# Patient Record
Sex: MALE | Race: BLACK OR AFRICAN AMERICAN | NOT HISPANIC OR LATINO | Employment: UNEMPLOYED | URBAN - METROPOLITAN AREA
[De-identification: names, ages, dates, MRNs, and addresses within clinical notes are randomized per-mention and may not be internally consistent; named-entity substitution may affect disease eponyms.]

---

## 2023-02-11 ENCOUNTER — HOSPITAL ENCOUNTER (EMERGENCY)
Facility: HOSPITAL | Age: 48
Discharge: HOME | End: 2023-02-11
Attending: INTERNAL MEDICINE
Payer: MEDICAID

## 2023-02-11 VITALS
SYSTOLIC BLOOD PRESSURE: 141 MMHG | HEIGHT: 68 IN | RESPIRATION RATE: 18 BRPM | HEART RATE: 77 BPM | OXYGEN SATURATION: 100 % | BODY MASS INDEX: 23.79 KG/M2 | WEIGHT: 157 LBS | DIASTOLIC BLOOD PRESSURE: 85 MMHG | TEMPERATURE: 99 F

## 2023-02-11 DIAGNOSIS — L03.116 CELLULITIS OF BOTH LOWER EXTREMITIES: Primary | ICD-10-CM

## 2023-02-11 DIAGNOSIS — L03.115 CELLULITIS OF BOTH LOWER EXTREMITIES: Primary | ICD-10-CM

## 2023-02-11 LAB
ALBUMIN SERPL-MCNC: 3.5 G/DL (ref 3.5–5)
ALBUMIN/GLOB SERPL: 1.1 RATIO (ref 1.1–2)
ALP SERPL-CCNC: 75 UNIT/L (ref 40–150)
ALT SERPL-CCNC: 13 UNIT/L (ref 0–55)
AMPHET UR QL SCN: POSITIVE
APPEARANCE UR: CLEAR
AST SERPL-CCNC: 13 UNIT/L (ref 5–34)
BARBITURATE SCN PRESENT UR: NEGATIVE
BASOPHILS # BLD AUTO: 0.04 X10(3)/MCL (ref 0–0.2)
BASOPHILS NFR BLD AUTO: 0.6 %
BENZODIAZ UR QL SCN: NEGATIVE
BILIRUB UR QL STRIP.AUTO: NEGATIVE MG/DL
BILIRUBIN DIRECT+TOT PNL SERPL-MCNC: 0.2 MG/DL
BNP BLD-MCNC: 11.8 PG/ML
BUN SERPL-MCNC: 15.3 MG/DL (ref 8.9–20.6)
CALCIUM SERPL-MCNC: 9.2 MG/DL (ref 8.4–10.2)
CANNABINOIDS UR QL SCN: NEGATIVE
CHLORIDE SERPL-SCNC: 108 MMOL/L (ref 98–107)
CK SERPL-CCNC: 252 U/L (ref 30–200)
CO2 SERPL-SCNC: 25 MMOL/L (ref 22–29)
COCAINE UR QL SCN: POSITIVE
COLOR UR AUTO: NORMAL
CREAT SERPL-MCNC: 1.1 MG/DL (ref 0.73–1.18)
D DIMER PPP IA.FEU-MCNC: 0.41 UG/ML FEU (ref 0–0.5)
EOSINOPHIL # BLD AUTO: 0.15 X10(3)/MCL (ref 0–0.9)
EOSINOPHIL NFR BLD AUTO: 2.1 %
ERYTHROCYTE [DISTWIDTH] IN BLOOD BY AUTOMATED COUNT: 13.8 % (ref 11.5–17)
GFR SERPLBLD CREATININE-BSD FMLA CKD-EPI: >60 MLS/MIN/1.73/M2
GLOBULIN SER-MCNC: 3.1 GM/DL (ref 2.4–3.5)
GLUCOSE SERPL-MCNC: 103 MG/DL (ref 74–100)
GLUCOSE UR QL STRIP.AUTO: NEGATIVE MG/DL
HCT VFR BLD AUTO: 41.7 % (ref 42–52)
HGB BLD-MCNC: 13 GM/DL (ref 14–18)
IMM GRANULOCYTES # BLD AUTO: 0.02 X10(3)/MCL (ref 0–0.04)
IMM GRANULOCYTES NFR BLD AUTO: 0.3 %
INR BLD: 0.95 (ref 2–3)
KETONES UR QL STRIP.AUTO: NEGATIVE MG/DL
LEUKOCYTE ESTERASE UR QL STRIP.AUTO: NEGATIVE UNIT/L
LYMPHOCYTES # BLD AUTO: 1.78 X10(3)/MCL (ref 0.6–4.6)
LYMPHOCYTES NFR BLD AUTO: 24.8 %
MCH RBC QN AUTO: 28 PG
MCHC RBC AUTO-ENTMCNC: 31.2 MG/DL (ref 33–36)
MCV RBC AUTO: 89.9 FL (ref 80–94)
MDMA UR QL SCN: NEGATIVE
MONOCYTES # BLD AUTO: 0.88 X10(3)/MCL (ref 0.1–1.3)
MONOCYTES NFR BLD AUTO: 12.3 %
NEUTROPHILS # BLD AUTO: 4.31 X10(3)/MCL (ref 2.1–9.2)
NEUTROPHILS NFR BLD AUTO: 59.9 %
NITRITE UR QL STRIP.AUTO: NEGATIVE
NRBC BLD AUTO-RTO: 0 %
OPIATES UR QL SCN: NEGATIVE
PCP UR QL: NEGATIVE
PH UR STRIP.AUTO: 7 [PH]
PH UR: 7 [PH] (ref 3–11)
PLATELET # BLD AUTO: 272 X10(3)/MCL (ref 130–400)
PMV BLD AUTO: 10.4 FL (ref 7.4–10.4)
POCT GLUCOSE: 134 MG/DL (ref 70–110)
POTASSIUM SERPL-SCNC: 4.3 MMOL/L (ref 3.5–5.1)
PROT SERPL-MCNC: 6.6 GM/DL (ref 6.4–8.3)
PROT UR QL STRIP.AUTO: NEGATIVE MG/DL
PROTHROMBIN TIME: 12.5 SECONDS (ref 11.7–14.5)
RBC # BLD AUTO: 4.64 X10(6)/MCL (ref 4.7–6.1)
RBC UR QL AUTO: NEGATIVE UNIT/L
SODIUM SERPL-SCNC: 140 MMOL/L (ref 136–145)
SP GR UR STRIP.AUTO: 1.02
TROPONIN I SERPL-MCNC: <0.01 NG/ML (ref 0–0.04)
TSH SERPL-ACNC: 0.65 UIU/ML (ref 0.35–4.94)
UROBILINOGEN UR STRIP-ACNC: 0.2 MG/DL
WBC # SPEC AUTO: 7.2 X10(3)/MCL (ref 4.5–11.5)

## 2023-02-11 PROCEDURE — 84484 ASSAY OF TROPONIN QUANT: CPT | Performed by: INTERNAL MEDICINE

## 2023-02-11 PROCEDURE — 85025 COMPLETE CBC W/AUTO DIFF WBC: CPT | Performed by: INTERNAL MEDICINE

## 2023-02-11 PROCEDURE — 85610 PROTHROMBIN TIME: CPT | Performed by: INTERNAL MEDICINE

## 2023-02-11 PROCEDURE — 82550 ASSAY OF CK (CPK): CPT | Performed by: INTERNAL MEDICINE

## 2023-02-11 PROCEDURE — 81003 URINALYSIS AUTO W/O SCOPE: CPT | Performed by: INTERNAL MEDICINE

## 2023-02-11 PROCEDURE — 80053 COMPREHEN METABOLIC PANEL: CPT | Performed by: INTERNAL MEDICINE

## 2023-02-11 PROCEDURE — 85379 FIBRIN DEGRADATION QUANT: CPT | Performed by: INTERNAL MEDICINE

## 2023-02-11 PROCEDURE — 84443 ASSAY THYROID STIM HORMONE: CPT | Performed by: INTERNAL MEDICINE

## 2023-02-11 PROCEDURE — 80307 DRUG TEST PRSMV CHEM ANLYZR: CPT | Performed by: INTERNAL MEDICINE

## 2023-02-11 PROCEDURE — 83880 ASSAY OF NATRIURETIC PEPTIDE: CPT | Performed by: INTERNAL MEDICINE

## 2023-02-11 PROCEDURE — 99283 EMERGENCY DEPT VISIT LOW MDM: CPT | Mod: 25

## 2023-02-11 PROCEDURE — 25000003 PHARM REV CODE 250: Performed by: INTERNAL MEDICINE

## 2023-02-11 RX ORDER — CEPHALEXIN 500 MG/1
500 CAPSULE ORAL EVERY 12 HOURS
Qty: 20 CAPSULE | Refills: 0 | Status: SHIPPED | OUTPATIENT
Start: 2023-02-11 | End: 2023-02-21

## 2023-02-11 RX ADMIN — SODIUM CHLORIDE 1000 ML: 9 INJECTION, SOLUTION INTRAVENOUS at 03:02

## 2023-02-11 NOTE — ED PROVIDER NOTES
Source of History:  Patient, moderate limitations as poor historian    Chief complaint:  Leg Swelling (C/o franki lower leg swelling X 3 days. Denies SOB, Denies trauma, States this has happened to [facility] in the past but that he does not remember what the problem was at that time)      HPI:  Ander Villagomez is a 47 y.o. male presenting with Leg Swelling (C/o franki lower leg swelling X 3 days. Denies SOB, Denies trauma, States this has happened to [facility] in the past but that he does not remember what the problem was at that time)       48yo M with Hx of drug abuse, CKD 2, HTN, presents with lower extremity swelling x 3 days, says he has been walking a lot for the last few days, not very forthcoming with information, denies SOB, denies CP, denies fever    This patient presents complaining of swelling and pain to the bilateral lower extremity. Onset of symptoms was abrupt starting 3 days ago and has been gradually worsening since that time. The patient does not recall history of trauma to the area. The patient denies being on a recent long auto or plane trip. The patient has not had a history of blood clots or coagulopathy. There is not a history of recent surgery. The patient denies chest pain, hemoptysis, shortness of breath, and cough        Review of Systems   Constitutional symptoms:  Negative except as documented in HPI.   Skin symptoms:  Negative except as documented in HPI.   HEENT symptoms:  Negative except as documented in HPI.   Respiratory symptoms:  Negative except as documented in HPI.   Cardiovascular symptoms:  Negative except as documented in HPI.   Gastrointestinal symptoms:  Negative except as documented in HPI.    Genitourinary symptoms:  Negative except as documented in HPI.   Musculoskeletal symptoms:  Negative except as documented in HPI.   Neurologic symptoms:  Negative except as documented in HPI.   Psychiatric symptoms:  Negative except as documented in HPI.   Allergy/immunologic symptoms:  Negative  "except as documented in HPI.             Additional review of systems information: All other systems reviewed and otherwise negative.      Review of patient's allergies indicates:  No Known Allergies    PMH:  As per HPI and below:    No past medical history on file.     No family history on file.    No past surgical history on file.         There is no problem list on file for this patient.       Physical Exam:    BP (!) 141/85 (BP Location: Left arm, Patient Position: Sitting)   Pulse 77   Temp 98.6 °F (37 °C) (Oral)   Resp 18   Ht 5' 8" (1.727 m)   Wt 71.2 kg (157 lb)   SpO2 100%   BMI 23.87 kg/m²     Nursing note and vital signs reviewed.    General:  Alert, no acute distress.   Skin: Normal for Ethnic Origin, No cyanosis, BL LE erythema up to mid calf  HEENT: Normocephalic and atraumatic, Vision unchanged, Pupils symmetric, No icterus , Nasal mucosa is pink and moist  Cardiovascular:  Regular rate and rhythm, 1+ edema both LE, left worse than right  Chest Wall: No deformity, equal chest rise  Respiratory:  Lungs are clear to auscultation, respirations are non-labored.    Musculoskeletal:  No deformity, Normal perfusion to all extremities  Gastrointestinal:  Soft, Non distended  Neurological:  Alert and oriented, normal motor observed, normal speech observed.    Psychiatric:  Cooperative, flat mood & affect.        Labs that have been ordered have been independently reviewed and interpreted by myself.     Old Chart Reviewed.      Initial Impression/ Differential Dx:  Peripheral edema, uncontrolled hypertension, renal insufficiency, liver failure, DVT, cellulitis, edema related to trauma, hypothyroidism      MDM:      Reviewed Nurses Note.    Reviewed Pertinent old records.    Orders Placed This Encounter    CBC Auto Differential    Comprehensive Metabolic Panel    CK    Urinalysis, Reflex to Urine Culture    Drug Screen, Urine    Troponin I    Protime-INR    BNP    D-Dimer, Quantitative    CBC with " Differential    TSH    Insert peripheral IV    sodium chloride 0.9% bolus 1,000 mL 1,000 mL    cephALEXin (KEFLEX) 500 MG capsule                    Labs Reviewed   COMPREHENSIVE METABOLIC PANEL - Abnormal; Notable for the following components:       Result Value    Chloride 108 (*)     Glucose Level 103 (*)     All other components within normal limits   CK - Abnormal; Notable for the following components:    Creatine Kinase 252 (*)     All other components within normal limits   DRUG SCREEN, URINE (BEAKER) - Abnormal; Notable for the following components:    Amphetamines, Urine Positive (*)     Cocaine, Urine Positive (*)     All other components within normal limits    Narrative:     Cut off concentrations:    Amphetamines - 1000 ng/ml  Barbiturates - 200 ng/ml  Benzodiazepine - 200 ng/ml  Cannabinoids (THC) - 50 ng/ml  Cocaine - 300 ng/ml  Fentanyl - 1.0 ng/ml  MDMA - 500 ng/ml  Opiates - 300 ng/ml   Phencyclidine (PCP) - 25 ng/ml    Specimen submitted for drug analysis and tested for pH and specific gravity in order to evaluate sample integrity. Suspect tampering if specific gravity is <1.003 and/or pH is not within the range of 4.5 - 8.0  False negatives may result form substances such as bleach added to urine.  False positives may result for the presence of a substance with similar chemical structure to the drug or its metabolite.    This test provides only a PRELIMINARY analytical test result. A more specific alternate chemical method must be used in order to obtain a confirmed analytical result. Gas chromatography/mass spectrometry (GC/MS) is the preferred confirmatory method. Other chemical confirmation methods are available. Clinical consideration and professional judgement should be applied to any drug of abuse test result, particularly when preliminary positive results are used.    Positive results will be confirmed only at the physicians request. Unconfirmed screening results are to be used only for  medical purposes (treatment).        PROTIME-INR - Abnormal; Notable for the following components:    INR 0.95 (*)     All other components within normal limits   CBC WITH DIFFERENTIAL - Abnormal; Notable for the following components:    RBC 4.64 (*)     Hgb 13.0 (*)     Hct 41.7 (*)     MCHC 31.2 (*)     All other components within normal limits   TROPONIN I - Normal   B-TYPE NATRIURETIC PEPTIDE - Normal   D DIMER, QUANTITATIVE - Normal   CBC W/ AUTO DIFFERENTIAL    Narrative:     The following orders were created for panel order CBC Auto Differential.  Procedure                               Abnormality         Status                     ---------                               -----------         ------                     CBC with Differential[[id number]]        Abnormal            Final result                 Please view results for these tests on the individual orders.   URINALYSIS, REFLEX TO URINE CULTURE   TSH          No orders to display        Admission on 02/11/2023   Component Date Value Ref Range Status    Sodium Level 02/11/2023 140  136 - 145 mmol/L Final    Potassium Level 02/11/2023 4.3  3.5 - 5.1 mmol/L Final    Chloride 02/11/2023 108 (H)  98 - 107 mmol/L Final    Carbon Dioxide 02/11/2023 25  22 - 29 mmol/L Final    Glucose Level 02/11/2023 103 (H)  74 - 100 mg/dL Final    Blood Urea Nitrogen 02/11/2023 15.3  8.9 - 20.6 mg/dL Final    Creatinine 02/11/2023 1.10  0.73 - 1.18 mg/dL Final    Calcium Level Total 02/11/2023 9.2  8.4 - 10.2 mg/dL Final    Protein Total 02/11/2023 6.6  6.4 - 8.3 gm/dL Final    Albumin Level 02/11/2023 3.5  3.5 - 5.0 g/dL Final    Globulin 02/11/2023 3.1  2.4 - 3.5 gm/dL Final    Albumin/Globulin Ratio 02/11/2023 1.1  1.1 - 2.0 ratio Final    Bilirubin Total 02/11/2023 0.2  <=1.5 mg/dL Final    Alkaline Phosphatase 02/11/2023 75  40 - 150 unit/L Final    Alanine Aminotransferase 02/11/2023 13  0 - 55 unit/L Final    Aspartate Aminotransferase 02/11/2023 13  5 - 34 unit/L  Final    eGFR 02/11/2023 >60  mls/min/1.73/m2 Final    Creatine Kinase 02/11/2023 252 (H)  30 - 200 U/L Final    Color, UA 02/11/2023 Straw  Yellow, Light-Yellow, Dark Yellow, Melonie, Straw Final    Appearance, UA 02/11/2023 Clear  Clear Final    Specific Gravity, UA 02/11/2023 1.020   Final    pH, UA 02/11/2023 7.0  5.0 - 8.5 Final    Protein, UA 02/11/2023 Negative  Negative mg/dL Final    Glucose, UA 02/11/2023 Negative  Negative, Normal mg/dL Final    Ketones, UA 02/11/2023 Negative  Negative mg/dL Final    Blood, UA 02/11/2023 Negative  Negative unit/L Final    Bilirubin, UA 02/11/2023 Negative  Negative mg/dL Final    Urobilinogen, UA 02/11/2023 0.2  0.2, 1.0, Normal mg/dL Final    Nitrites, UA 02/11/2023 Negative  Negative Final    Leukocyte Esterase, UA 02/11/2023 Negative  Negative unit/L Final    Amphetamines, Urine 02/11/2023 Positive (A)  Negative Final    Barbituates, Urine 02/11/2023 Negative  Negative Final    Benzodiazepine, Urine 02/11/2023 Negative  Negative Final    Cannabinoids, Urine 02/11/2023 Negative  Negative Final    Cocaine, Urine 02/11/2023 Positive (A)  Negative Final    MDMA, Urine 02/11/2023 Negative  Negative Final    Opiates, Urine 02/11/2023 Negative  Negative Final    Phencyclidine, Urine 02/11/2023 Negative  Negative Final    pH, Urine 02/11/2023 7.0  3.0 - 11.0 Final    Troponin-I 02/11/2023 <0.010  0.000 - 0.045 ng/mL Final    PT 02/11/2023 12.5  11.7 - 14.5 seconds Final    INR 02/11/2023 0.95 (L)  2.00 - 3.00 Final    Natriuretic Peptide 02/11/2023 11.8  <=100.0 pg/mL Final    D-Dimer 02/11/2023 0.41  0.00 - 0.50 ug/mL FEU Final    WBC 02/11/2023 7.2  4.5 - 11.5 x10(3)/mcL Final    RBC 02/11/2023 4.64 (L)  4.70 - 6.10 x10(6)/mcL Final    Hgb 02/11/2023 13.0 (L)  14.0 - 18.0 gm/dL Final    Hct 02/11/2023 41.7 (L)  42.0 - 52.0 % Final    MCV 02/11/2023 89.9  80.0 - 94.0 fL Final    MCH 02/11/2023 28.0  pg Final    MCHC 02/11/2023 31.2 (L)  33.0 - 36.0 mg/dL Final    RDW  02/11/2023 13.8  11.5 - 17.0 % Final    Platelet 02/11/2023 272  130 - 400 x10(3)/mcL Final    MPV 02/11/2023 10.4  7.4 - 10.4 fL Final    Neut % 02/11/2023 59.9  % Final    Lymph % 02/11/2023 24.8  % Final    Mono % 02/11/2023 12.3  % Final    Eos % 02/11/2023 2.1  % Final    Basophil % 02/11/2023 0.6  % Final    Lymph # 02/11/2023 1.78  0.6 - 4.6 x10(3)/mcL Final    Neut # 02/11/2023 4.31  2.1 - 9.2 x10(3)/mcL Final    Mono # 02/11/2023 0.88  0.1 - 1.3 x10(3)/mcL Final    Eos # 02/11/2023 0.15  0 - 0.9 x10(3)/mcL Final    Baso # 02/11/2023 0.04  0 - 0.2 x10(3)/mcL Final    IG# 02/11/2023 0.02  0 - 0.04 x10(3)/mcL Final    IG% 02/11/2023 0.3  % Final    NRBC% 02/11/2023 0.0  % Final       Imaging Results    None                        ED Course as of 02/11/23 1621   Sat Feb 11, 2023   1606 D-Dimer: 0.41 [MP]   1606 Cocaine (Metab.)(!): Positive [MP]   1606 Amphetamine Screen, Ur(!): Positive [MP]      ED Course User Index  [MP] Pedro Tapia DO                        Diagnostic Impression:    1. Cellulitis of both lower extremities         ED Disposition Condition    Discharge Stable             Follow-up Information       [facility] - Emergency Dept.    Specialty: Emergency Medicine  Why: If symptoms worsen  Contact information:  [address]  [facility] [address] [phone]                            ED Prescriptions       Medication Sig Dispense Start Date End Date Auth. Provider    cephALEXin (KEFLEX) 500 MG capsule Take 1 capsule (500 mg total) by mouth every 12 (twelve) hours. for 10 days 20 capsule 2/11/2023 2/21/2023 Pedro Tapia, DO          Follow-up Information       Follow up With Specialties Details Why Contact Info    [facility] - Emergency Dept Emergency Medicine  If symptoms worsen [address]  [facility] [address] [phone]             Pedro Tapia,   02/11/23 9164

## 2023-02-11 NOTE — ED TRIAGE NOTES
C/o franki lower leg swelling X 3 days. Denies SOB, Denies trauma, States this has happened to himj in the past but that he does not remember what the problem was at that time

## 2024-07-21 ENCOUNTER — HOSPITAL ENCOUNTER (EMERGENCY)
Facility: HOSPITAL | Age: 49
Discharge: HOME | End: 2024-07-21
Attending: INTERNAL MEDICINE
Payer: MEDICAID

## 2024-07-21 VITALS
DIASTOLIC BLOOD PRESSURE: 86 MMHG | HEIGHT: 68 IN | BODY MASS INDEX: 26.4 KG/M2 | TEMPERATURE: 98 F | SYSTOLIC BLOOD PRESSURE: 130 MMHG | OXYGEN SATURATION: 100 % | RESPIRATION RATE: 17 BRPM | WEIGHT: 174.19 LBS | HEART RATE: 99 BPM

## 2024-07-21 DIAGNOSIS — M47.816 LUMBAR SPONDYLOSIS: ICD-10-CM

## 2024-07-21 DIAGNOSIS — S39.012A STRAIN OF LUMBAR REGION, INITIAL ENCOUNTER: Primary | ICD-10-CM

## 2024-07-21 PROCEDURE — 99284 EMERGENCY DEPT VISIT MOD MDM: CPT | Mod: 25

## 2024-07-21 PROCEDURE — 96372 THER/PROPH/DIAG INJ SC/IM: CPT | Performed by: PHYSICIAN ASSISTANT

## 2024-07-21 PROCEDURE — 63600175 PHARM REV CODE 636 W HCPCS: Performed by: PHYSICIAN ASSISTANT

## 2024-07-21 RX ORDER — DICLOFENAC SODIUM 75 MG/1
75 TABLET, DELAYED RELEASE ORAL 2 TIMES DAILY
Qty: 14 TABLET | Refills: 0 | Status: SHIPPED | OUTPATIENT
Start: 2024-07-21

## 2024-07-21 RX ORDER — KETOROLAC TROMETHAMINE 30 MG/ML
30 INJECTION, SOLUTION INTRAMUSCULAR; INTRAVENOUS
Status: COMPLETED | OUTPATIENT
Start: 2024-07-21 | End: 2024-07-21

## 2024-07-21 RX ORDER — TIZANIDINE 2 MG/1
2 TABLET ORAL EVERY 8 HOURS
Qty: 15 TABLET | Refills: 0 | Status: SHIPPED | OUTPATIENT
Start: 2024-07-21 | End: 2024-07-26

## 2024-07-21 RX ADMIN — KETOROLAC TROMETHAMINE 30 MG: 30 INJECTION, SOLUTION INTRAMUSCULAR; INTRAVENOUS at 10:07

## 2024-07-21 NOTE — ED PROVIDER NOTES
"Encounter Date: 7/21/2024       History     Chief Complaint   Patient presents with    Back Pain     Lower back pain and bilateral intermittent tingling to the legs x 2 weeks. States was in an accident >1 year ago and has had this problem since. Out of gabapentin RX.     Ander Villagomez is a 48 y.o. male who presents to the ED with complaints of low back pain with radiation down his bilateral legs with intermittent tingling down bilateral legs that started 2 week(s) ago. He reports he was in an overdose >1 year ago and has had the symptoms ever since. States at the time of the overdose, he was found down in a "weird position". States he takes Gabapentin for the symptoms but reports he has been out "for awhile." Denies saddle anesthesia, loss of bowel or bladder control, dysuria, hematuria.         The history is provided by the patient. No  was used.     Review of patient's allergies indicates:  No Known Allergies  History reviewed. No pertinent past medical history.  No past surgical history on file.  No family history on file.  Social History     Tobacco Use    Smoking status: Every Day     Types: Vaping with nicotine, Cigarettes    Smokeless tobacco: Never   Substance Use Topics    Alcohol use: Not Currently    Drug use: Yes     Comment: heroin     Review of Systems   Constitutional:  Negative for chills, fatigue and fever.   HENT:  Negative for congestion, ear pain, sinus pain and sore throat.    Eyes:  Negative for pain.   Respiratory:  Negative for cough, chest tightness and shortness of breath.    Cardiovascular:  Negative for chest pain.   Gastrointestinal:  Negative for abdominal pain, constipation, diarrhea, nausea and vomiting.   Genitourinary:  Negative for dysuria.   Musculoskeletal:  Positive for back pain. Negative for joint swelling.   Skin:  Negative for color change and rash.   Neurological:  Positive for numbness. Negative for dizziness and weakness.   Psychiatric/Behavioral:  " Negative for behavioral problems and confusion.        Physical Exam     Initial Vitals [07/21/24 1014]   BP Pulse Resp Temp SpO2   130/86 99 17 98.1 °F (36.7 °C) 100 %      MAP       --         Physical Exam    Nursing note and vitals reviewed.  Constitutional: He appears well-developed and well-nourished.   HENT:   Head: Normocephalic and atraumatic.   Right Ear: External ear normal.   Left Ear: External ear normal.   Nose: Nose normal.   Eyes: Conjunctivae and EOM are normal.   Neck: Neck supple. No thyromegaly present. No tracheal deviation present.   Cardiovascular:  Normal rate, regular rhythm and normal heart sounds.     Exam reveals no gallop and no friction rub.       No murmur heard.  Pulmonary/Chest: Breath sounds normal. No respiratory distress. He has no wheezes. He has no rhonchi. He has no rales. He exhibits no tenderness.   Abdominal: Abdomen is soft. He exhibits no distension.   Musculoskeletal:         General: Tenderness present.      Cervical back: Neck supple.      Lumbar back: Spasms and tenderness present. Negative right straight leg raise test and negative left straight leg raise test.        Back:      Neurological: He is alert and oriented to person, place, and time. GCS score is 15. GCS eye subscore is 4. GCS verbal subscore is 5. GCS motor subscore is 6.   Skin: Skin is warm. Capillary refill takes less than 2 seconds. No rash and no abscess noted. No erythema. No pallor.   Psychiatric: He has a normal mood and affect.         ED Course   Procedures  Labs Reviewed - No data to display       Imaging Results              X-Ray Lumbar Spine Ap And Lateral (Final result)  Result time 07/21/24 12:05:08      Final result by Cholo Carter MD (07/21/24 12:05:08)                   Impression:      Mild spondylosis.      Electronically signed by: Cholo Carter  Date:    07/21/2024  Time:    12:05               Narrative:    EXAMINATION:  XR LUMBAR SPINE AP AND LATERAL    CLINICAL HISTORY:  low  "back pain;    TECHNIQUE:  Two-view    COMPARISON:  None available    FINDINGS:  There are bilateral L1 rudimentary ribs.  Lumbar vertebrae stature and alignment is preserved.  There are no significant degenerative narrowings of the intervertebral disc spaces.  There is mild facet arthropathy at L5-S1.  No acute fracture or malalignment identified.                                       Medications   ketorolac injection 30 mg (30 mg Intramuscular Given 7/21/24 1050)     Medical Decision Making  DDX: DDD, lumbar strain, back fracture, among others    Ander Villagomez is a 48 y.o. male who presents to the ED with complaints of low back pain with radiation down his bilateral legs with intermittent tingling down bilateral legs that started 2 week(s) ago. He reports he was in an overdose >1 year ago and has had the symptoms ever since. States at the time of the overdose, he was found down in a "weird position". States he takes Gabapentin for the symptoms but reports he has been out "for awhile." Denies saddle anesthesia, loss of bowel or bladder control, dysuria, hematuria.       Hospital Course: On exam, patient has no midline spine tenderness. He has a spasm and tenderness of the bilateral lumbar paraspinal muscles. He has not had an xray in 2 years, will obtain today. Xray reveals mild spondylosis. Patient given PO Toradol in the ED. Will DC home with Diclofenac and Tizanidine. Strict return precautions given. Stable for discharge.     Amount and/or Complexity of Data Reviewed  Radiology: ordered.    Risk  Prescription drug management.                                      Clinical Impression:  Final diagnoses:  [S39.012A] Strain of lumbar region, initial encounter (Primary)  [M47.816] Lumbar spondylosis          ED Disposition Condition    Discharge Stable          ED Prescriptions       Medication Sig Dispense Start Date End Date Auth. Provider    diclofenac (VOLTAREN) 75 MG EC tablet Take 1 tablet (75 mg total) by mouth 2 " (two) times daily. 14 tablet 7/21/2024 -- Denae Prajapati PA-C    tiZANidine (ZANAFLEX) 2 MG tablet Take 1 tablet (2 mg total) by mouth every 8 (eight) hours. for 5 days 15 tablet 7/21/2024 7/26/2024 Denae Prajapati PA-C          Follow-up Information       Follow up With Specialties Details Why Contact Info    [facility]  In 1 week  [facility] [facility] [address]    [facility] Dept Emergency Medicine In 3 days As needed, If symptoms worsen [facility] [facility] [address] [phone]             Denae Prajapati PA-C  07/21/24 9367

## 2024-08-17 ENCOUNTER — HOSPITAL ENCOUNTER (EMERGENCY)
Facility: HOSPITAL | Age: 49
Discharge: HOME | End: 2024-08-17
Attending: INTERNAL MEDICINE
Payer: MEDICAID

## 2024-08-17 VITALS
DIASTOLIC BLOOD PRESSURE: 88 MMHG | SYSTOLIC BLOOD PRESSURE: 148 MMHG | RESPIRATION RATE: 16 BRPM | WEIGHT: 178.56 LBS | HEIGHT: 68 IN | TEMPERATURE: 98 F | BODY MASS INDEX: 27.06 KG/M2 | HEART RATE: 70 BPM | OXYGEN SATURATION: 100 %

## 2024-08-17 DIAGNOSIS — I10 HYPERTENSION, UNSPECIFIED TYPE: Primary | ICD-10-CM

## 2024-08-17 DIAGNOSIS — R05.9 COUGH: ICD-10-CM

## 2024-08-17 DIAGNOSIS — J06.9 VIRAL URI WITH COUGH: ICD-10-CM

## 2024-08-17 DIAGNOSIS — R60.0 PEDAL EDEMA: ICD-10-CM

## 2024-08-17 LAB
ALBUMIN SERPL-MCNC: 3.5 G/DL (ref 3.5–5)
ALBUMIN/GLOB SERPL: 1.2 RATIO (ref 1.1–2)
ALP SERPL-CCNC: 63 UNIT/L (ref 40–150)
ALT SERPL-CCNC: 15 UNIT/L (ref 0–55)
ANION GAP SERPL CALC-SCNC: 8 MEQ/L
AST SERPL-CCNC: 15 UNIT/L (ref 5–34)
BILIRUB SERPL-MCNC: 0.2 MG/DL
BUN SERPL-MCNC: 12 MG/DL (ref 8.9–20.6)
CALCIUM SERPL-MCNC: 9.4 MG/DL (ref 8.4–10.2)
CHLORIDE SERPL-SCNC: 108 MMOL/L (ref 98–107)
CO2 SERPL-SCNC: 25 MMOL/L (ref 22–29)
CREAT SERPL-MCNC: 1.02 MG/DL (ref 0.73–1.18)
CREAT/UREA NIT SERPL: 12
GFR SERPLBLD CREATININE-BSD FMLA CKD-EPI: >60 ML/MIN/1.73/M2
GLOBULIN SER-MCNC: 3 GM/DL (ref 2.4–3.5)
GLUCOSE SERPL-MCNC: 82 MG/DL (ref 74–100)
HOLD SPECIMEN: NORMAL
POTASSIUM SERPL-SCNC: 3.6 MMOL/L (ref 3.5–5.1)
PROT SERPL-MCNC: 6.5 GM/DL (ref 6.4–8.3)
SODIUM SERPL-SCNC: 141 MMOL/L (ref 136–145)

## 2024-08-17 PROCEDURE — 99284 EMERGENCY DEPT VISIT MOD MDM: CPT | Mod: 25

## 2024-08-17 PROCEDURE — 80053 COMPREHEN METABOLIC PANEL: CPT | Performed by: INTERNAL MEDICINE

## 2024-08-17 RX ORDER — AMLODIPINE BESYLATE 5 MG/1
5 TABLET ORAL NIGHTLY
Qty: 30 TABLET | Refills: 3 | Status: SHIPPED | OUTPATIENT
Start: 2024-08-17 | End: 2025-08-17

## 2024-08-17 RX ORDER — BENZONATATE 200 MG/1
200 CAPSULE ORAL 3 TIMES DAILY PRN
Qty: 30 CAPSULE | Refills: 0 | Status: SHIPPED | OUTPATIENT
Start: 2024-08-17

## 2024-08-17 RX ORDER — HYDROCHLOROTHIAZIDE 25 MG/1
25 TABLET ORAL DAILY
Qty: 30 TABLET | Refills: 3 | Status: SHIPPED | OUTPATIENT
Start: 2024-08-17 | End: 2025-08-17

## 2024-08-17 NOTE — ED PROVIDER NOTES
Encounter Date: 8/17/2024       History     Chief Complaint   Patient presents with    Cough    swelling to feet     Cough with swelling to feet (x)1 week. Afebrile. Also states history of high blood pressure but is currently out of meds. VssNilsa harrington     Presents with bilateral lower extremity swelling and productive cough. States Hx of HTN, out of his medications.  States swelling in the past, intermittent. Denies decrease in urination, injury, shortness of breath or chest pain. Denies fever.    The history is provided by the patient.     Review of patient's allergies indicates:  No Known Allergies  No past medical history on file.  No past surgical history on file.  No family history on file.  Social History     Tobacco Use    Smoking status: Every Day     Types: Vaping with nicotine, Cigarettes    Smokeless tobacco: Never   Substance Use Topics    Alcohol use: Not Currently    Drug use: Yes     Comment: heroin     Review of Systems   Respiratory:  Positive for cough.    Cardiovascular:  Positive for leg swelling.       Physical Exam     Initial Vitals [08/17/24 1211]   BP Pulse Resp Temp SpO2   (!) 151/87 82 18 97.8 °F (36.6 °C) 100 %      MAP       --         Physical Exam    Nursing note and vitals reviewed.  Constitutional: He appears well-developed and well-nourished. No distress.   HENT:   Head: Normocephalic and atraumatic.   Mouth/Throat: Oropharynx is clear and moist. No oropharyngeal exudate.   Eyes: Conjunctivae and EOM are normal. Pupils are equal, round, and reactive to light.   Neck: Neck supple. No JVD present.   Normal range of motion.  Cardiovascular:  Regular rhythm, normal heart sounds and intact distal pulses.           Pulmonary/Chest: Breath sounds normal. No respiratory distress.   Abdominal: Abdomen is soft. Bowel sounds are normal. He exhibits no distension. There is no abdominal tenderness. There is no rebound and no guarding.   Musculoskeletal:         General: Edema (+2, pitting,  bilateral) present. Normal range of motion.      Cervical back: Normal range of motion and neck supple.     Neurological: He is alert and oriented to person, place, and time. He has normal strength. GCS score is 15. GCS eye subscore is 4. GCS verbal subscore is 5. GCS motor subscore is 6.   Skin: Skin is warm and dry. No rash noted.   Psychiatric: His behavior is normal.         ED Course   Procedures  Labs Reviewed   COMPREHENSIVE METABOLIC PANEL - Abnormal       Result Value    Sodium 141      Potassium 3.6      Chloride 108 (*)     CO2 25      Glucose 82      Blood Urea Nitrogen 12.0      Creatinine 1.02      Calcium 9.4      Protein Total 6.5      Albumin 3.5      Globulin 3.0      Albumin/Globulin Ratio 1.2      Bilirubin Total 0.2      ALP 63      ALT 15      AST 15      eGFR >60      Anion Gap 8.0      BUN/Creatinine Ratio 12     EXTRA TUBES    Narrative:     The following orders were created for panel order EXTRA TUBES.  Procedure                               Abnormality         Status                     ---------                               -----------         ------                     Light Blue Top Hold[[id number]]                              In process                 Red Top Hold[[id number]]                                     In process                 Light Green Top Hold[[id number]]                             In process                 Lavender Top Hold[[id number]]                                In process                 Lavender Top Hold[[id number]]                                In process                 Gold Top Hold[[id number]]                                    In process                 Pink Top Hold[[id number]]                                    In process                   Please view results for these tests on the individual orders.   LIGHT BLUE TOP HOLD   RED TOP HOLD   LIGHT GREEN TOP HOLD   LAVENDER TOP HOLD   LAVENDER TOP HOLD   GOLD TOP HOLD   PINK TOP HOLD          Imaging Results               X-Ray Chest PA And Lateral (Final result)  Result time 08/17/24 12:54:58      Final result by Ramin Perez MD (08/17/24 12:54:58)                   Impression:      No acute cardiopulmonary process      Electronically signed by: Ramin Perez MD  Date:    08/17/2024  Time:    12:54               Narrative:    EXAMINATION:  Chest two views    CLINICAL HISTORY:  Cough    COMPARISON:  11/19/2021    FINDINGS:  Cardiac silhouette is normal size.  Central vessels are within normal limits.  No confluent airspace disease.  No visible pneumothorax or pleural effusion.  No acute osseous abnormality                                    X-Rays:   Independently Interpreted Readings:   Chest X-Ray: Normal heart size.  No infiltrates.  No acute abnormalities.     Medications - No data to display  Medical Decision Making  Amount and/or Complexity of Data Reviewed  Labs: ordered. Decision-making details documented in ED Course.  Radiology: ordered and independent interpretation performed. Decision-making details documented in ED Course.      Additional MDM:   Differential Diagnosis:   Renal failure, renal artery stenosis, medication side effect, pheochromocytoma, among others                                      Clinical Impression:  Final diagnoses:  [R05.9] Cough  [I10] Hypertension, unspecified type (Primary)  [R60.0] Pedal edema  [J06.9] Viral URI with cough                 Rasheed Henderson MD  08/17/24 4767